# Patient Record
Sex: FEMALE | Race: WHITE | Employment: FULL TIME | URBAN - METROPOLITAN AREA
[De-identification: names, ages, dates, MRNs, and addresses within clinical notes are randomized per-mention and may not be internally consistent; named-entity substitution may affect disease eponyms.]

---

## 2017-09-19 ENCOUNTER — OFFICE VISIT (OUTPATIENT)
Dept: OBGYN | Age: 32
End: 2017-09-19
Payer: COMMERCIAL

## 2017-09-19 VITALS
BODY MASS INDEX: 25.15 KG/M2 | HEIGHT: 69 IN | DIASTOLIC BLOOD PRESSURE: 68 MMHG | SYSTOLIC BLOOD PRESSURE: 110 MMHG | WEIGHT: 169.8 LBS

## 2017-09-19 DIAGNOSIS — R10.2 PELVIC CRAMPING: Primary | ICD-10-CM

## 2017-09-19 DIAGNOSIS — R21 RASH AND NONSPECIFIC SKIN ERUPTION: ICD-10-CM

## 2017-09-19 DIAGNOSIS — N92.6 IRREGULAR PERIODS/MENSTRUAL CYCLES: ICD-10-CM

## 2017-09-19 DIAGNOSIS — F41.9 ANXIETY: ICD-10-CM

## 2017-09-19 PROCEDURE — 99213 OFFICE O/P EST LOW 20 MIN: CPT | Performed by: ADVANCED PRACTICE MIDWIFE

## 2017-09-19 PROCEDURE — 76830 TRANSVAGINAL US NON-OB: CPT | Performed by: ADVANCED PRACTICE MIDWIFE

## 2017-09-19 RX ORDER — ESCITALOPRAM OXALATE 5 MG/1
5 TABLET ORAL DAILY
Qty: 30 TABLET | Refills: 1 | Status: SHIPPED | OUTPATIENT
Start: 2017-09-19 | End: 2017-11-09 | Stop reason: SDUPTHER

## 2017-09-19 RX ORDER — BETAMETHASONE DIPROPIONATE 0.5 MG/G
OINTMENT TOPICAL
Qty: 1 TUBE | Refills: 1 | Status: SHIPPED | OUTPATIENT
Start: 2017-09-19 | End: 2017-10-19

## 2017-09-19 ASSESSMENT — PATIENT HEALTH QUESTIONNAIRE - PHQ9
SUM OF ALL RESPONSES TO PHQ QUESTIONS 1-9: 0
1. LITTLE INTEREST OR PLEASURE IN DOING THINGS: 0
2. FEELING DOWN, DEPRESSED OR HOPELESS: 0
SUM OF ALL RESPONSES TO PHQ9 QUESTIONS 1 & 2: 0

## 2017-11-08 ENCOUNTER — OFFICE VISIT (OUTPATIENT)
Dept: OBGYN | Age: 32
End: 2017-11-08
Payer: COMMERCIAL

## 2017-11-08 VITALS
WEIGHT: 172 LBS | SYSTOLIC BLOOD PRESSURE: 116 MMHG | HEIGHT: 69 IN | DIASTOLIC BLOOD PRESSURE: 76 MMHG | BODY MASS INDEX: 25.48 KG/M2

## 2017-11-08 DIAGNOSIS — R21 RASH AND NONSPECIFIC SKIN ERUPTION: Primary | ICD-10-CM

## 2017-11-08 DIAGNOSIS — F43.23 ADJUSTMENT DISORDER WITH MIXED ANXIETY AND DEPRESSED MOOD: ICD-10-CM

## 2017-11-08 PROCEDURE — 99212 OFFICE O/P EST SF 10 MIN: CPT | Performed by: ADVANCED PRACTICE MIDWIFE

## 2017-11-08 NOTE — PROGRESS NOTES
PROBLEM VISIT     Date of service: 2017    Minh Adkins  Is a 29 y.o.  female    PT's PCP is: Bess Lubin MD     : 1985                                             Subjective:       Patient's last menstrual period was 2017 (approximate). OB History    Para Term  AB Living   2 2 2     2   SAB TAB Ectopic Molar Multiple Live Births             2      # Outcome Date GA Lbr Mohan/2nd Weight Sex Delivery Anes PTL Lv   2 Term 02/24/15 40w0d   F Vag-Spont EPI  MARVEL   1 Term 12 40w0d  7 lb 5 oz (3.317 kg) F Vag-Spont EPI  MARVEL           History   Smoking Status    Never Smoker   Smokeless Tobacco    Never Used        History   Alcohol Use    Yes     Comment: social       History   Sexual Activity    Sexual activity: Yes    Partners: Male       Allergies: Review of patient's allergies indicates no known allergies. Chief Complaint   Patient presents with    Other     Medication check. Patient currently taking Lexapro 5 mg qd and feels things have improved. Last Yearly:      Last pap:     Last HPV:     PE:  Vital Signs  Blood pressure 116/76, height 5' 9\" (1.753 m), weight 172 lb (78 kg), last menstrual period 2017, not currently breastfeeding. Labs:    No results found for this visit on 17. NURSE: Gail ACEVES    HPI: here for review, had irregular cycles which have now normalized, had anxiety and depression issues from issues with , her issues have not resolved but she is \"handling better\" she had a scaly rash over knees and large joints and the steroid cream is helping      PT denies fever, chills, nausea and vomiting       Objective: No acute distress  Excellent communications  Well-nourished                           Assessment and Plan         1. Rash and nonspecific skin eruption     2.  Adjustment disorder with mixed anxiety and depressed mood               Return in about 6 months (around 2018) for med check.    FF: 15 minutes    There are no Patient Instructions on file for this visit. Over 75%of time spent on counseling and care coordination on: see assessment and plan,  She was also counseled on her preventative health maintenance recommendations and follow-up.       Kristin Barnes,11/8/2017 3:55 PM

## 2017-11-09 DIAGNOSIS — F41.9 ANXIETY: ICD-10-CM

## 2017-11-14 DIAGNOSIS — F41.9 ANXIETY: Primary | ICD-10-CM

## 2017-11-14 RX ORDER — ESCITALOPRAM OXALATE 5 MG/1
5 TABLET ORAL DAILY
Qty: 30 TABLET | Refills: 5 | Status: SHIPPED | OUTPATIENT
Start: 2017-11-14 | End: 2018-06-28

## 2017-11-14 RX ORDER — ESCITALOPRAM OXALATE 5 MG/1
5 TABLET ORAL DAILY
Qty: 30 TABLET | Refills: 1 | Status: SHIPPED | OUTPATIENT
Start: 2017-11-14 | End: 2018-06-28

## 2018-01-22 ENCOUNTER — TELEPHONE (OUTPATIENT)
Dept: OBGYN | Age: 33
End: 2018-01-22

## 2018-01-22 DIAGNOSIS — N92.6 IRREGULAR MENSES: Primary | ICD-10-CM

## 2018-01-22 RX ORDER — LEVONORGESTREL / ETHINYL ESTRADIOL AND ETHINYL ESTRADIOL 150-30(84)
1 KIT ORAL DAILY
Qty: 91 TABLET | Refills: 3 | Status: SHIPPED | OUTPATIENT
Start: 2018-01-22 | End: 2019-01-17 | Stop reason: SDUPTHER

## 2018-05-03 DIAGNOSIS — F41.9 ANXIETY: Primary | ICD-10-CM

## 2018-05-03 RX ORDER — ESCITALOPRAM OXALATE 5 MG/1
5 TABLET ORAL DAILY
Qty: 30 TABLET | Refills: 0 | Status: SHIPPED | OUTPATIENT
Start: 2018-05-03 | End: 2018-05-29 | Stop reason: SDUPTHER

## 2018-05-29 DIAGNOSIS — F41.9 ANXIETY: ICD-10-CM

## 2018-05-29 RX ORDER — ESCITALOPRAM OXALATE 5 MG/1
5 TABLET ORAL DAILY
Qty: 30 TABLET | Refills: 0 | Status: SHIPPED | OUTPATIENT
Start: 2018-05-29 | End: 2018-06-28 | Stop reason: SDUPTHER

## 2018-06-28 DIAGNOSIS — F41.9 ANXIETY: ICD-10-CM

## 2018-06-28 RX ORDER — ESCITALOPRAM OXALATE 5 MG/1
5 TABLET ORAL DAILY
Qty: 30 TABLET | Refills: 0 | Status: SHIPPED | OUTPATIENT
Start: 2018-06-28 | End: 2018-08-01 | Stop reason: SDUPTHER

## 2019-01-17 DIAGNOSIS — N92.6 IRREGULAR MENSES: ICD-10-CM

## 2019-01-17 RX ORDER — LEVONORGESTREL / ETHINYL ESTRADIOL AND ETHINYL ESTRADIOL 150-30(84)
1 KIT ORAL DAILY
Qty: 91 TABLET | Refills: 3 | Status: SHIPPED | OUTPATIENT
Start: 2019-01-17 | End: 2022-07-25

## 2019-10-03 ENCOUNTER — OFFICE VISIT (OUTPATIENT)
Dept: OBGYN | Age: 34
End: 2019-10-03
Payer: COMMERCIAL

## 2019-10-03 VITALS
SYSTOLIC BLOOD PRESSURE: 114 MMHG | WEIGHT: 170.6 LBS | HEIGHT: 69 IN | BODY MASS INDEX: 25.27 KG/M2 | DIASTOLIC BLOOD PRESSURE: 74 MMHG

## 2019-10-03 DIAGNOSIS — F43.23 ADJUSTMENT DISORDER WITH MIXED ANXIETY AND DEPRESSED MOOD: ICD-10-CM

## 2019-10-03 DIAGNOSIS — Z01.419 ENCOUNTER FOR ANNUAL ROUTINE GYNECOLOGICAL EXAMINATION: ICD-10-CM

## 2019-10-03 DIAGNOSIS — N92.6 IRREGULAR MENSES: Primary | ICD-10-CM

## 2019-10-03 PROCEDURE — 99213 OFFICE O/P EST LOW 20 MIN: CPT | Performed by: ADVANCED PRACTICE MIDWIFE

## 2019-10-03 PROCEDURE — G0444 DEPRESSION SCREEN ANNUAL: HCPCS | Performed by: ADVANCED PRACTICE MIDWIFE

## 2019-10-03 RX ORDER — ESCITALOPRAM OXALATE 5 MG/1
5 TABLET ORAL DAILY
Qty: 30 TABLET | Refills: 11 | Status: SHIPPED | OUTPATIENT
Start: 2019-10-03 | End: 2022-03-31 | Stop reason: ALTCHOICE

## 2019-10-03 RX ORDER — LEVONORGESTREL AND ETHINYL ESTRADIOL 0.15-0.03
1 KIT ORAL DAILY
Qty: 1 PACKET | Refills: 3 | Status: SHIPPED | OUTPATIENT
Start: 2019-10-03 | End: 2020-10-26

## 2019-10-03 ASSESSMENT — PATIENT HEALTH QUESTIONNAIRE - PHQ9
2. FEELING DOWN, DEPRESSED OR HOPELESS: 2
SUM OF ALL RESPONSES TO PHQ QUESTIONS 1-9: 17
9. THOUGHTS THAT YOU WOULD BE BETTER OFF DEAD, OR OF HURTING YOURSELF: 0
1. LITTLE INTEREST OR PLEASURE IN DOING THINGS: 3
5. POOR APPETITE OR OVEREATING: 3
7. TROUBLE CONCENTRATING ON THINGS, SUCH AS READING THE NEWSPAPER OR WATCHING TELEVISION: 1
8. MOVING OR SPEAKING SO SLOWLY THAT OTHER PEOPLE COULD HAVE NOTICED. OR THE OPPOSITE, BEING SO FIGETY OR RESTLESS THAT YOU HAVE BEEN MOVING AROUND A LOT MORE THAN USUAL: 1
SUM OF ALL RESPONSES TO PHQ9 QUESTIONS 1 & 2: 5
4. FEELING TIRED OR HAVING LITTLE ENERGY: 3
10. IF YOU CHECKED OFF ANY PROBLEMS, HOW DIFFICULT HAVE THESE PROBLEMS MADE IT FOR YOU TO DO YOUR WORK, TAKE CARE OF THINGS AT HOME, OR GET ALONG WITH OTHER PEOPLE: 0
6. FEELING BAD ABOUT YOURSELF - OR THAT YOU ARE A FAILURE OR HAVE LET YOURSELF OR YOUR FAMILY DOWN: 1
3. TROUBLE FALLING OR STAYING ASLEEP: 3
SUM OF ALL RESPONSES TO PHQ QUESTIONS 1-9: 17

## 2021-02-04 DIAGNOSIS — N92.6 IRREGULAR MENSES: ICD-10-CM

## 2021-02-04 RX ORDER — LEVONORGESTREL AND ETHINYL ESTRADIOL 0.15-0.03
KIT ORAL
Qty: 91 TABLET | Refills: 0 | Status: SHIPPED | OUTPATIENT
Start: 2021-02-04 | End: 2022-09-13 | Stop reason: ALTCHOICE

## 2021-03-24 ENCOUNTER — HOSPITAL ENCOUNTER (OUTPATIENT)
Age: 36
Setting detail: SPECIMEN
Discharge: HOME | End: 2021-03-24
Payer: COMMERCIAL

## 2021-03-24 ENCOUNTER — OFFICE VISIT (OUTPATIENT)
Dept: OBGYN | Age: 36
End: 2021-03-24
Payer: COMMERCIAL

## 2021-03-24 VITALS
HEIGHT: 69 IN | WEIGHT: 170 LBS | SYSTOLIC BLOOD PRESSURE: 116 MMHG | BODY MASS INDEX: 25.18 KG/M2 | DIASTOLIC BLOOD PRESSURE: 70 MMHG

## 2021-03-24 DIAGNOSIS — Z01.419 ENCOUNTER FOR ANNUAL ROUTINE GYNECOLOGICAL EXAMINATION: Primary | ICD-10-CM

## 2021-03-24 DIAGNOSIS — N92.6 IRREGULAR MENSES: ICD-10-CM

## 2021-03-24 DIAGNOSIS — Z12.4 SCREENING FOR CERVICAL CANCER: ICD-10-CM

## 2021-03-24 PROCEDURE — 99395 PREV VISIT EST AGE 18-39: CPT | Performed by: ADVANCED PRACTICE MIDWIFE

## 2021-03-24 PROCEDURE — G0145 SCR C/V CYTO,THINLAYER,RESCR: HCPCS

## 2021-03-24 PROCEDURE — 87624 HPV HI-RISK TYP POOLED RSLT: CPT

## 2021-03-24 RX ORDER — SPIRONOLACTONE 50 MG/1
TABLET, FILM COATED ORAL
COMMUNITY
Start: 2021-03-03 | End: 2022-09-13

## 2021-03-24 RX ORDER — LEVONORGESTREL / ETHINYL ESTRADIOL AND ETHINYL ESTRADIOL 150-30(84)
1 KIT ORAL DAILY
Qty: 91 TABLET | Refills: 3 | Status: SHIPPED | OUTPATIENT
Start: 2021-03-24 | End: 2022-07-25

## 2021-03-24 ASSESSMENT — ENCOUNTER SYMPTOMS
BACK PAIN: 0
ABDOMINAL PAIN: 0
SHORTNESS OF BREATH: 0

## 2021-03-24 ASSESSMENT — PATIENT HEALTH QUESTIONNAIRE - PHQ9
SUM OF ALL RESPONSES TO PHQ QUESTIONS 1-9: 0
1. LITTLE INTEREST OR PLEASURE IN DOING THINGS: 0

## 2021-03-24 NOTE — PROGRESS NOTES
YEARLY PHYSICAL    Date of service: 3/24/2021    Quincy Bailey  Is a 37 y.o.   female    PT's PCP is: Artem Shore MD     : 1985                                             Subjective:       Patient's last menstrual period was 2021 (approximate). Are your menses regular: yes    OB History    Para Term  AB Living   2 2 2     2   SAB TAB Ectopic Molar Multiple Live Births             2      # Outcome Date GA Lbr Mohan/2nd Weight Sex Delivery Anes PTL Lv   2 Term 02/24/15 40w0d   F Vag-Spont EPI  MARVLE   1 Term 12 40w0d  7 lb 5 oz (3.317 kg) F Vag-Spont EPI  MARVEL        Social History     Tobacco Use   Smoking Status Never Smoker   Smokeless Tobacco Never Used        Social History     Substance and Sexual Activity   Alcohol Use Yes    Comment: social       Family History   Problem Relation Age of Onset    Cancer Maternal Grandfather         Colon    Other Other         No family HX DVT, Breast or Ovarian Cancer       Any family history of breast or ovarian cancer: No    Any family history of blood clots: No      Allergies: Patient has no known allergies.       Current Outpatient Medications:     spironolactone (ALDACTONE) 50 MG tablet, TAKE 1 TABLET BY MOUTH EVERY DAY, Disp: , Rfl:     levonorgestrel-ethinyl estradiol (SEASONALE) 0.15-0.03 MG per tablet, TAKE 1 TABLET BY MOUTH EVERY DAY, Disp: 91 tablet, Rfl: 0    escitalopram (LEXAPRO) 5 MG tablet, Take 1 tablet by mouth daily (Patient not taking: Reported on 3/24/2021), Disp: 30 tablet, Rfl: 11    Levonorgest-Eth Estrad -Day 0.15-0.03 &0.01 MG TABS, TAKE 1 TABLET BY MOUTH DAILY (Patient not taking: Reported on 3/24/2021), Disp: 91 tablet, Rfl: 3    escitalopram (LEXAPRO) 5 MG tablet, TAKE 1 TABLET BY MOUTH EVERY DAY (Patient not taking: Reported on 10/3/2019), Disp: 30 tablet, Rfl: 12    Social History     Substance and Sexual Activity   Sexual Activity Yes    Partners: Male    Birth control/protection: Pill       Any bleeding or pain with intercourse: No    Last Yearly:  2019    Last pap: 2017    Last HPV: Never    Last Mammogram: Never    Last Dexascan Never    Last colorectal screen- type:Never*  date  Never    Do you do self breast exams: No    Past Medical History:   Diagnosis Date    Anxiety     Irregular menses        Past Surgical History:   Procedure Laterality Date    HIP SURGERY  1990       Family History   Problem Relation Age of Onset    Cancer Maternal Grandfather         Colon    Other Other         No family HX DVT, Breast or Ovarian Cancer       Chief Complaint   Patient presents with    Gynecologic Exam     Yearly exam. Last pap 07/31/2017 negative. PE:  Vital Signs  Blood pressure 116/70, height 5' 9\" (1.753 m), weight 170 lb (77.1 kg), last menstrual period 01/13/2021, not currently breastfeeding. Estimated body mass index is 25.1 kg/m² as calculated from the following:    Height as of this encounter: 5' 9\" (1.753 m). Weight as of this encounter: 170 lb (77.1 kg). Labs:    No results found for this visit on 03/24/21. PHQ-9 Total Score: 0 (3/24/2021  4:03 PM)      NURSE: dmitry ACEVES    HPI: here for annual gyn exam; is on aldactone for skin sees dermatologist    Review of Systems   Constitutional: Negative. HENT: Negative for congestion. Respiratory: Negative for shortness of breath. Cardiovascular: Negative for chest pain. Gastrointestinal: Negative for abdominal pain. Genitourinary: Negative for dysuria. Musculoskeletal: Negative for back pain. Skin: Negative for rash. Neurological: Negative for headaches. Psychiatric/Behavioral: The patient is not nervous/anxious.           Objective  Lymphatic:   no lymphadenopathy  Heent:   negative   Cor: regular rate and rhythm, no murmurs              Pul:clear to auscultation bilaterally- no wheezes, rales or rhonchi, normal air movement, no respiratory distress      GI: Abdomen soft, non-tender. BS normal. No masses,  No organomegaly           Extremities: normal strength, tone, and muscle mass   Breasts: Breast:normal appearance, no masses or tenderness   Pelvic Exam: GENITAL/URINARY:  External Genitalia:  General appearance; normal, Hair distribution; normal, Lesions absent  Urethral Meatus:  Size normal, Location normal, Lesions absent, Prolapse absent  Urethra: Fullness absent, Masses absent  Bladder:  Fullness absent, Masses absent, Tenderness absent, Cystocele absent  Vagina:  General appearance normal, Estrogen effect normal, Discharge absent, Lesions absent, Pelvic support normal  Cervix:  General appearance normal, Lesions absent, Discharge absent, Tenderness absent, Enlargement absent, Nodularity absent  Uterus:  Size normal, Tenderness absent  Adenexa: Masses absent, Tenderness absent  Anus/Perineum:  Lesions absent and Masses absent                                                              Assessment and Plan          Diagnosis Orders   1. Encounter for annual routine gynecological examination     2. Screening for cervical cancer  PAP SMEAR             I am having Samanta Irby maintain her escitalopram, Levonorgest-Eth Estrad 91-Day, escitalopram, levonorgestrel-ethinyl estradiol, and spironolactone. Return in about 1 year (around 3/24/2022) for yearly. She was also counseled on her preventative health maintenance recommendations and follow-up. There are no Patient Instructions on file for this visit.     Iftikhar Barnes,3/24/2021 4:31 PM

## 2021-03-26 LAB
HPV SAMPLE: NORMAL
HPV, GENOTYPE 16: NOT DETECTED
HPV, GENOTYPE 18: NOT DETECTED
HPV, HIGH RISK OTHER: NOT DETECTED
HPV, INTERPRETATION: NORMAL
SPECIMEN DESCRIPTION: NORMAL

## 2021-03-30 LAB — CYTOLOGY REPORT: NORMAL

## 2022-03-30 ENCOUNTER — OFFICE VISIT (OUTPATIENT)
Dept: OBGYN | Age: 37
End: 2022-03-30
Payer: COMMERCIAL

## 2022-03-30 VITALS
HEIGHT: 69 IN | DIASTOLIC BLOOD PRESSURE: 74 MMHG | SYSTOLIC BLOOD PRESSURE: 122 MMHG | BODY MASS INDEX: 25.56 KG/M2 | WEIGHT: 172.6 LBS

## 2022-03-30 DIAGNOSIS — N92.6 IRREGULAR MENSES: ICD-10-CM

## 2022-03-30 DIAGNOSIS — Z01.419 ENCOUNTER FOR ANNUAL ROUTINE GYNECOLOGICAL EXAMINATION: Primary | ICD-10-CM

## 2022-03-30 PROCEDURE — 99395 PREV VISIT EST AGE 18-39: CPT | Performed by: ADVANCED PRACTICE MIDWIFE

## 2022-03-30 RX ORDER — LEVONORGESTREL / ETHINYL ESTRADIOL AND ETHINYL ESTRADIOL 150-30(84)
1 KIT ORAL DAILY
Qty: 91 TABLET | Refills: 3 | Status: SHIPPED | OUTPATIENT
Start: 2022-03-30 | End: 2022-07-25

## 2022-03-30 NOTE — PROGRESS NOTES
YEARLY PHYSICAL    Date of service: 3/30/2022    Debra Saxena  Is a 31 y.o.   female    PT's PCP is: Jae Jacobsen MD     : 1985                                             Subjective:       Patient's last menstrual period was 2022 (approximate). Are your menses regular: yes    OB History    Para Term  AB Living   2 2 2     2   SAB IAB Ectopic Molar Multiple Live Births             2      # Outcome Date GA Lbr Mohan/2nd Weight Sex Delivery Anes PTL Lv   2 Term 02/24/15 40w0d   F Vag-Spont EPI  MARVEL   1 Term 12 40w0d  7 lb 5 oz (3.317 kg) F Vag-Spont EPI  MARVEL        Social History     Tobacco Use   Smoking Status Never Smoker   Smokeless Tobacco Never Used        Social History     Substance and Sexual Activity   Alcohol Use Yes    Comment: social       Family History   Problem Relation Age of Onset    Cancer Maternal Grandfather         Colon    Other Other         No family HX DVT, Breast or Ovarian Cancer       Any family history of breast or ovarian cancer: No    Any family history of blood clots: No    Have you had a positive covid test: No    Have you had the covid immunization: Yes      Allergies: Patient has no known allergies.       Current Outpatient Medications:     spironolactone (ALDACTONE) 50 MG tablet, TAKE 1 TABLET BY MOUTH EVERY DAY, Disp: , Rfl:     levonorgestrel-ethinyl estradiol (SEASONALE) 0.15-0.03 MG per tablet, TAKE 1 TABLET BY MOUTH EVERY DAY, Disp: 91 tablet, Rfl: 0    Levonorgest-Eth Estrad 91-Day 0.15-0.03 &0.01 MG TABS, Take 1 tablet by mouth daily, Disp: 91 tablet, Rfl: 3    escitalopram (LEXAPRO) 5 MG tablet, Take 1 tablet by mouth daily (Patient not taking: Reported on 3/24/2021), Disp: 30 tablet, Rfl: 11    Levonorgest-Eth Estrad 91-Day 0.15-0.03 &0.01 MG TABS, TAKE 1 TABLET BY MOUTH DAILY (Patient not taking: Reported on 3/24/2021), Disp: 91 tablet, Rfl: 3   escitalopram (LEXAPRO) 5 MG tablet, TAKE 1 TABLET BY MOUTH EVERY DAY (Patient not taking: Reported on 10/3/2019), Disp: 30 tablet, Rfl: 12    Social History     Substance and Sexual Activity   Sexual Activity Yes    Partners: Male    Birth control/protection: Pill       Any bleeding or pain with intercourse: No    Last Yearly:  2021    Last pap: 2021    Last HPV: 2021    Last Mammogram: never    Last Cristal Desir never    Last colorectal screen- type:never*  date  never    Do you do self breast exams: No    Past Medical History:   Diagnosis Date    Anxiety     Irregular menses        Past Surgical History:   Procedure Laterality Date    HIP SURGERY         Family History   Problem Relation Age of Onset    Cancer Maternal Grandfather         Colon    Other Other         No family HX DVT, Breast or Ovarian Cancer       Chief Complaint   Patient presents with    Gynecologic Exam     Yearly exam. Last pap 2021 negative, HPV not detected. PE:  Vital Signs  Blood pressure 122/74, height 5' 9\" (1.753 m), weight 172 lb 9.6 oz (78.3 kg), last menstrual period 2022, not currently breastfeeding. Estimated body mass index is 25.49 kg/m² as calculated from the following:    Height as of this encounter: 5' 9\" (1.753 m). Weight as of this encounter: 172 lb 9.6 oz (78.3 kg). Labs:    No results found for this visit on 22. No data recorded    NURSE: Jennifer ACEVES    HPI: here for annual gyn exam, is having a hard time because grandma  this week    Review of Systems   Constitutional: Negative. HENT: Negative for congestion. Respiratory: Negative for shortness of breath. Cardiovascular: Negative for chest pain. Gastrointestinal: Negative for abdominal pain. Genitourinary: Negative for dysuria. Musculoskeletal: Negative for back pain. Skin: Negative for rash. Neurological: Negative for headaches. Psychiatric/Behavioral: The patient is not nervous/anxious. Objective  Lymphatic:   no lymphadenopathy  Heent:   negative   Cor: regular rate and rhythm, no murmurs              Pul:clear to auscultation bilaterally- no wheezes, rales or rhonchi, normal air movement, no respiratory distress      GI: Abdomen soft, non-tender. BS normal. No masses,  No organomegaly           Extremities: normal strength, tone, and muscle mass   Breasts: Breast:normal appearance, no masses or tenderness   Pelvic Exam: GENITAL/URINARY:  External Genitalia:  General appearance; normal, Hair distribution; normal, Lesions absent  Urethral Meatus:  Size normal, Location normal, Lesions absent, Prolapse absent  Urethra: Fullness absent, Masses absent  Bladder:  Fullness absent, Masses absent, Tenderness absent, Cystocele absent  Vagina:  General appearance normal, Estrogen effect normal, Discharge absent, Lesions absent, Pelvic support normal  Cervix:  General appearance normal, Lesions absent, Discharge absent, Tenderness absent, Enlargement absent, Nodularity absent  Uterus:  Size normal, Tenderness absent  Adenexa: Masses absent, Tenderness absent  Anus/Perineum:  Lesions absent and Masses absent                              Assessment and Plan          Diagnosis Orders   1. Encounter for annual routine gynecological examination     2. Irregular menses  Levonorgest-Eth Estrad 91-Day 0.15-0.03 &0.01 MG TABS             I am having Samanta Irby start on Levonorgest-Eth Estrad 91-Day. I am also having her maintain her escitalopram, Levonorgest-Eth Estrad 91-Day, escitalopram, levonorgestrel-ethinyl estradiol, spironolactone, and Levonorgest-Eth Estrad 91-Day. Return in about 1 year (around 3/30/2023) for yearly. She was also counseled on her preventative health maintenance recommendations and follow-up. There are no Patient Instructions on file for this visit.     MIGUELINA Zamarripa CNM,3/30/2022 5:10 PM

## 2022-03-31 ASSESSMENT — ENCOUNTER SYMPTOMS
ABDOMINAL PAIN: 0
SHORTNESS OF BREATH: 0
BACK PAIN: 0

## 2022-09-13 ENCOUNTER — OFFICE VISIT (OUTPATIENT)
Dept: OBGYN | Age: 37
End: 2022-09-13
Payer: COMMERCIAL

## 2022-09-13 VITALS
BODY MASS INDEX: 25.3 KG/M2 | WEIGHT: 170.8 LBS | HEIGHT: 69 IN | SYSTOLIC BLOOD PRESSURE: 120 MMHG | DIASTOLIC BLOOD PRESSURE: 76 MMHG

## 2022-09-13 DIAGNOSIS — Z97.5 PRESENCE OF IUD: Primary | ICD-10-CM

## 2022-09-13 PROCEDURE — 99212 OFFICE O/P EST SF 10 MIN: CPT | Performed by: ADVANCED PRACTICE MIDWIFE

## 2022-09-13 NOTE — PROGRESS NOTES
PROBLEM VISIT     Date of service: 2022    Frank Coto  Is a 31 y.o.  female    PT's PCP is: Stewart Armenta MD     : 1985                                             Subjective:       Patient's last menstrual period was 2022 (approximate). OB History    Para Term  AB Living   2 2 2     2   SAB IAB Ectopic Molar Multiple Live Births             2      # Outcome Date GA Lbr Mohan/2nd Weight Sex Delivery Anes PTL Lv   2 Term 02/24/15 40w0d   F Vag-Spont EPI  MARVEL   1 Term 12 40w0d  7 lb 5 oz (3.317 kg) F Vag-Spont EPI  MARVEL        Social History     Tobacco Use   Smoking Status Never   Smokeless Tobacco Never        Social History     Substance and Sexual Activity   Alcohol Use Yes    Comment: social       Allergies: Patient has no known allergies. Current Outpatient Medications:     PARAGARD INTRAUTERINE COPPER IU, by IntraUTERine route, Disp: , Rfl:     spironolactone (ALDACTONE) 50 MG tablet, TAKE 1 TABLET BY MOUTH EVERY DAY (Patient not taking: Reported on 2022), Disp: , Rfl:     levonorgestrel-ethinyl estradiol (Derinda Bays) 0.15-0.03 MG per tablet, TAKE 1 TABLET BY MOUTH EVERY DAY (Patient not taking: Reported on 2022), Disp: 91 tablet, Rfl: 0    Social History     Substance and Sexual Activity   Sexual Activity Yes    Partners: Male    Birth control/protection: I.U.D. Comment: Paragard 2022       Last Yearly:  2021    Last pap: 2021    Last HPV: 2021    Have you had a positive covid test: No    Have you had the covid immunization: Yes    Chief Complaint   Patient presents with    Menstrual Problem     String check, patient had Paragard  IUD placed 2022. PE:  Vital Signs  Blood pressure 120/76, height 5' 9\" (1.753 m), weight 170 lb 12.8 oz (77.5 kg), last menstrual period 2022, not currently breastfeeding.   Estimated body mass index is 25.22 kg/m² as calculated from the following:    Height as of this encounter: 5' 9\" (1.753 m). Weight as of this encounter: 170 lb 12.8 oz (77.5 kg). No data recorded    NURSE: Jennifer ACEVES    HPI: here for iud string check, denies c/o     PT denies fever, chills, nausea and vomiting       Objective   No acute distress  Excellent communications  Well-nourished    Pelvic Exam: GENITAL/URINARY:  External Genitalia:  General appearance; normal, Hair distribution; normal, Lesions absent  Urethral Meatus:  Size normal, Location normal, Lesions absent, Prolapse absent  Urethra: Fullness absent, Masses absent  Bladder:  Fullness absent, Masses absent, Tenderness absent, Cystocele absent  Vagina:  General appearance normal, Estrogen effect normal, Discharge absent, Lesions absent, Pelvic support normal  Cervix:  General appearance normal, Lesions absent, Discharge absent, Tenderness absent, Enlargement absent, Nodularity absent, strings present from os                                                      Results reviewed today:    No results found for this visit on 09/13/22. Assessment and Plan          Diagnosis Orders   1. Presence of IUD                  I have discontinued Samanta Irby's levonorgestrel-ethinyl estradiol and spironolactone. I am also having her maintain her PARAGARD INTRAUTERINE COPPER IU. We will continue to administer Paragard Intrauterine Copper. Return return to normal yearly appointments. There are no Patient Instructions on file for this visit.     Time spent 15 minutes      MIGUELINA Whyte CNM,9/13/2022 4:13 PM

## 2023-04-05 ENCOUNTER — OFFICE VISIT (OUTPATIENT)
Dept: OBGYN | Age: 38
End: 2023-04-05

## 2023-04-05 VITALS
HEIGHT: 69 IN | WEIGHT: 174 LBS | SYSTOLIC BLOOD PRESSURE: 112 MMHG | DIASTOLIC BLOOD PRESSURE: 70 MMHG | BODY MASS INDEX: 25.77 KG/M2

## 2023-04-05 DIAGNOSIS — Z01.419 ENCOUNTER FOR ANNUAL ROUTINE GYNECOLOGICAL EXAMINATION: Primary | ICD-10-CM

## 2023-04-05 NOTE — PROGRESS NOTES
YEARLY PHYSICAL    Date of service: 2023    Delana Landau  Is a 30 y.o.  , female    PT's PCP is: Angie Pat MD     : 1985                                             Subjective:       Patient's last menstrual period was 2023 (approximate). Are your menses regular: yes    OB History    Para Term  AB Living   2 2 2     2   SAB IAB Ectopic Molar Multiple Live Births             2      # Outcome Date GA Lbr Mohan/2nd Weight Sex Delivery Anes PTL Lv   2 Term 02/24/15 40w0d   F Vag-Spont EPI  MARVEL   1 Term 12 40w0d  7 lb 5 oz (3.317 kg) F Vag-Spont EPI  MARVEL        Social History     Tobacco Use   Smoking Status Never   Smokeless Tobacco Never        Social History     Substance and Sexual Activity   Alcohol Use Yes    Comment: social       Family History   Problem Relation Age of Onset    Diabetes Father     Cancer Maternal Grandfather         Colon    Other Other         No family HX DVT, Breast or Ovarian Cancer       Any family history of breast or ovarian cancer: No    Any family history of blood clots: No    Have you had a positive covid test: No    Have you had the covid immunization: Yes      Allergies: Patient has no known allergies. Current Outpatient Medications:     PARAGARD INTRAUTERINE COPPER IU, by IntraUTERine route, Disp: , Rfl:     Social History     Substance and Sexual Activity   Sexual Activity Yes    Partners: Male    Birth control/protection: I.U.D.     Comment: Paragard 2022       Any bleeding or pain with intercourse: No    Last Yearly date:  2022    Last pap date and results: 2021 negative    Last HPV date and results: 2021 negative    Last Mammogram date and results: never    Last Dexa scan date and results: never    Last colorectal screen- type:never*  date  never results never    Do you do self breast exams: No    Past Medical History:   Diagnosis Date

## 2023-04-07 ASSESSMENT — ENCOUNTER SYMPTOMS
SHORTNESS OF BREATH: 0
BACK PAIN: 0
ABDOMINAL PAIN: 0

## 2024-06-11 ENCOUNTER — OFFICE VISIT (OUTPATIENT)
Dept: OBGYN | Age: 39
End: 2024-06-11
Payer: COMMERCIAL

## 2024-06-11 VITALS
SYSTOLIC BLOOD PRESSURE: 122 MMHG | WEIGHT: 172.8 LBS | BODY MASS INDEX: 25.59 KG/M2 | HEIGHT: 69 IN | DIASTOLIC BLOOD PRESSURE: 74 MMHG

## 2024-06-11 DIAGNOSIS — N92.6 IRREGULAR MENSES: ICD-10-CM

## 2024-06-11 DIAGNOSIS — Z01.419 ENCOUNTER FOR ANNUAL ROUTINE GYNECOLOGICAL EXAMINATION: Primary | ICD-10-CM

## 2024-06-11 PROCEDURE — 99395 PREV VISIT EST AGE 18-39: CPT | Performed by: ADVANCED PRACTICE MIDWIFE

## 2024-06-11 SDOH — ECONOMIC STABILITY: FOOD INSECURITY: WITHIN THE PAST 12 MONTHS, THE FOOD YOU BOUGHT JUST DIDN'T LAST AND YOU DIDN'T HAVE MONEY TO GET MORE.: NEVER TRUE

## 2024-06-11 SDOH — ECONOMIC STABILITY: INCOME INSECURITY: HOW HARD IS IT FOR YOU TO PAY FOR THE VERY BASICS LIKE FOOD, HOUSING, MEDICAL CARE, AND HEATING?: NOT HARD AT ALL

## 2024-06-11 SDOH — ECONOMIC STABILITY: HOUSING INSECURITY
IN THE LAST 12 MONTHS, WAS THERE A TIME WHEN YOU DID NOT HAVE A STEADY PLACE TO SLEEP OR SLEPT IN A SHELTER (INCLUDING NOW)?: NO

## 2024-06-11 SDOH — ECONOMIC STABILITY: FOOD INSECURITY: WITHIN THE PAST 12 MONTHS, YOU WORRIED THAT YOUR FOOD WOULD RUN OUT BEFORE YOU GOT MONEY TO BUY MORE.: NEVER TRUE

## 2024-06-11 ASSESSMENT — ENCOUNTER SYMPTOMS
SINUS PAIN: 0
CHEST TIGHTNESS: 0
SHORTNESS OF BREATH: 0
ABDOMINAL PAIN: 0

## 2024-06-11 ASSESSMENT — PATIENT HEALTH QUESTIONNAIRE - PHQ9
SUM OF ALL RESPONSES TO PHQ QUESTIONS 1-9: 0
2. FEELING DOWN, DEPRESSED OR HOPELESS: NOT AT ALL
SUM OF ALL RESPONSES TO PHQ QUESTIONS 1-9: 0
1. LITTLE INTEREST OR PLEASURE IN DOING THINGS: NOT AT ALL
SUM OF ALL RESPONSES TO PHQ9 QUESTIONS 1 & 2: 0

## 2024-06-11 NOTE — PROGRESS NOTES
YEARLY PHYSICAL    Date of service: 2024    Samanta Irby  Is a 38 y.o.  , female    PT's PCP is: Brijesh Zambrano MD     : 1985                                             Subjective:       Patient's last menstrual period was 2024 (approximate).     Are your menses regular: yes    OB History    Para Term  AB Living   2 2 2     2   SAB IAB Ectopic Molar Multiple Live Births             2      # Outcome Date GA Lbr Mohan/2nd Weight Sex Delivery Anes PTL Lv   2 Term 02/24/15 40w0d   F Vag-Spont EPI  MARVEL   1 Term 12 40w0d  3.317 kg (7 lb 5 oz) F Vag-Spont EPI  MARVEL        Social History     Tobacco Use   Smoking Status Never   Smokeless Tobacco Never        Social History     Substance and Sexual Activity   Alcohol Use Yes    Comment: social       Family History   Problem Relation Age of Onset    Diabetes Father     Cancer Maternal Grandfather         Colon    Other Other         No family HX DVT, Breast or Ovarian Cancer       Any family history of breast or ovarian cancer: No    Any family history of blood clots: No    Allergies: Patient has no known allergies.      Current Outpatient Medications:     PARAGARD INTRAUTERINE COPPER IU, by IntraUTERine route, Disp: , Rfl:     Social History     Substance and Sexual Activity   Sexual Activity Yes    Partners: Male    Birth control/protection: I.U.D.    Comment: Paragard 2022       Any bleeding or pain with intercourse: No    Last Yearly date:  2023    Last pap date : Date of last Cervical Cancer screen (HPV or PAP): 3/24/2021     results:negative    Last HPV date and results: 2021 negative    Has pt ever had an abnormal:No    IF yes result and date: never     Mammogram Result (most recent):  No results found for this or any previous visit from the past 3650 days.        DEXA Result (most recent):  No results found for this or any previous visit

## 2025-03-27 ENCOUNTER — TELEPHONE (OUTPATIENT)
Dept: UROLOGY | Age: 40
End: 2025-03-27

## 2025-03-27 DIAGNOSIS — L65.9 HAIR LOSS: ICD-10-CM

## 2025-03-27 DIAGNOSIS — R63.5 WEIGHT GAIN: ICD-10-CM

## 2025-03-27 DIAGNOSIS — N92.0 MENORRHAGIA WITH REGULAR CYCLE: ICD-10-CM

## 2025-03-27 DIAGNOSIS — R68.82 DECREASED LIBIDO: ICD-10-CM

## 2025-03-27 DIAGNOSIS — R61 NIGHT SWEATS: Primary | ICD-10-CM

## 2025-04-06 LAB
ALBUMIN: 4.2 G/DL (ref 3.5–5.2)
ALK PHOSPHATASE: 71 U/L (ref 30–101)
ALT SERPL-CCNC: 12 U/L (ref 5–33)
ANION GAP SERPL CALCULATED.3IONS-SCNC: 10 MMOL/L (ref 7–16)
AST SERPL-CCNC: 17 U/L (ref 9–40)
BASOPHILS ABSOLUTE: 0.02 K/UL (ref 0–0.2)
BASOPHILS RELATIVE PERCENT: 0.4 % (ref 0–2)
BILIRUB SERPL-MCNC: 0.2 MG/DL
BUN BLDV-MCNC: 14 MG/DL (ref 6–20)
CALCIUM SERPL-MCNC: 8.8 MG/DL (ref 8.6–10.5)
CHLORIDE BLD-SCNC: 109 MMOL/L (ref 96–107)
CO2: 23 MMOL/L (ref 18–32)
CREAT SERPL-MCNC: 0.72 MG/DL (ref 0.51–1.15)
EGFR IF NONAFRICAN AMERICAN: 110 ML/MIN/1.73M2
EOSINOPHILS ABSOLUTE: 0 K/UL (ref 0–0.8)
EOSINOPHILS RELATIVE PERCENT: 0 % (ref 0–5)
ESTRADIOL LEVEL: 100 PG/ML
FOLLICLE STIMULATING HORMONE: 6.1 MIU/ML
GLUCOSE: 95 MG/DL (ref 70–100)
HCT VFR BLD CALC: 37.2 % (ref 35–47)
HEMOGLOBIN: 12 G/DL (ref 11.9–16)
IMMATURE GRANS (ABS): 0.01 K/UL (ref 0–0.06)
IMMATURE GRANULOCYTES %: 0.2 % (ref 0–2)
IRON % SATURATION: 9 % (ref 13–45)
IRON: 37 UG/DL (ref 37–145)
LYMPHOCYTES ABSOLUTE: 1.49 K/UL (ref 0.9–5.2)
LYMPHOCYTES RELATIVE PERCENT: 31.9 % (ref 20–45)
MCH RBC QN AUTO: 26 PG (ref 26–33)
MCHC RBC AUTO-ENTMCNC: 32.3 G/DL (ref 32–35)
MCV RBC AUTO: 81 FL (ref 75–100)
MONOCYTES ABSOLUTE: 0.35 K/UL (ref 0.1–1)
MONOCYTES RELATIVE PERCENT: 7.5 % (ref 0–13)
NEUTROPHILS ABSOLUTE: 2.8 K/UL (ref 1.9–8)
NEUTROPHILS RELATIVE PERCENT: 60 % (ref 45–75)
PDW BLD-RTO: 14.1 % (ref 11.2–14.8)
PLATELET # BLD: 191 THOUS/CMM (ref 140–440)
POTASSIUM SERPL-SCNC: 4.7 MMOL/L (ref 3.5–5.4)
RBC # BLD: 4.62 MILL/CMM (ref 3.8–5.2)
SODIUM BLD-SCNC: 142 MMOL/L (ref 135–148)
T3 TOTAL: 92 NG/DL (ref 80–200)
THYROXINE (T4): 6.6 UG/DL (ref 4.5–12)
TOTAL IRON BINDING CAPACITY: 405 UG/DL (ref 250–450)
TOTAL PROTEIN: 7.1 G/DL (ref 6–8.3)
TSH SERPL DL<=0.05 MIU/L-ACNC: 1.52 UIU/ML (ref 0.27–4.2)
UNSATURATED IRON BINDING CAPACITY: 368 UG/DL (ref 112–347)
VITAMIN D 25-HYDROXY: 25.1 NG/ML (ref 30–100)
WBC # BLD: 4.7 THDS/CMM (ref 3.6–11)

## 2025-04-09 LAB
ALBUMIN: 4.5 G/DL (ref 3.6–5.1)
SEX HORMONE BINDING GLOBULIN: 70.9 NMOL/L (ref 24.6–122)
TESTOSTERONE BIOAVAILABLE, NON MALE: 5.4 NG/DL (ref 4.1–25.5)
TESTOSTERONE FREE: 2.2 PG/ML (ref 1.3–9.2)
TESTOSTERONE, LCMS: 21 NG/DL (ref 9–55)

## 2025-04-10 ENCOUNTER — RESULTS FOLLOW-UP (OUTPATIENT)
Dept: UROLOGY | Age: 40
End: 2025-04-10

## 2025-06-12 ENCOUNTER — HOSPITAL ENCOUNTER (OUTPATIENT)
Age: 40
Setting detail: SPECIMEN
Discharge: HOME | End: 2025-06-12
Payer: COMMERCIAL

## 2025-06-12 ENCOUNTER — OFFICE VISIT (OUTPATIENT)
Dept: OBGYN | Age: 40
End: 2025-06-12
Payer: COMMERCIAL

## 2025-06-12 VITALS
DIASTOLIC BLOOD PRESSURE: 84 MMHG | BODY MASS INDEX: 27.11 KG/M2 | SYSTOLIC BLOOD PRESSURE: 122 MMHG | HEIGHT: 69 IN | WEIGHT: 183 LBS

## 2025-06-12 DIAGNOSIS — Z01.419 ENCOUNTER FOR ANNUAL ROUTINE GYNECOLOGICAL EXAMINATION: Primary | ICD-10-CM

## 2025-06-12 DIAGNOSIS — Z12.4 SCREENING FOR MALIGNANT NEOPLASM OF CERVIX: ICD-10-CM

## 2025-06-12 PROCEDURE — G0145 SCR C/V CYTO,THINLAYER,RESCR: HCPCS

## 2025-06-12 PROCEDURE — 99395 PREV VISIT EST AGE 18-39: CPT | Performed by: ADVANCED PRACTICE MIDWIFE

## 2025-06-12 RX ORDER — PROGESTERONE 200 MG/1
200 CAPSULE ORAL
COMMUNITY
Start: 2025-05-26

## 2025-06-12 SDOH — ECONOMIC STABILITY: FOOD INSECURITY: WITHIN THE PAST 12 MONTHS, THE FOOD YOU BOUGHT JUST DIDN'T LAST AND YOU DIDN'T HAVE MONEY TO GET MORE.: NEVER TRUE

## 2025-06-12 SDOH — ECONOMIC STABILITY: FOOD INSECURITY: WITHIN THE PAST 12 MONTHS, YOU WORRIED THAT YOUR FOOD WOULD RUN OUT BEFORE YOU GOT MONEY TO BUY MORE.: NEVER TRUE

## 2025-06-12 ASSESSMENT — PATIENT HEALTH QUESTIONNAIRE - PHQ9
2. FEELING DOWN, DEPRESSED OR HOPELESS: NOT AT ALL
SUM OF ALL RESPONSES TO PHQ QUESTIONS 1-9: 0
1. LITTLE INTEREST OR PLEASURE IN DOING THINGS: NOT AT ALL
SUM OF ALL RESPONSES TO PHQ QUESTIONS 1-9: 0

## 2025-06-12 ASSESSMENT — ENCOUNTER SYMPTOMS
SHORTNESS OF BREATH: 0
BACK PAIN: 0
ABDOMINAL PAIN: 0

## 2025-06-12 NOTE — PROGRESS NOTES
recommendations and follow-up.     There are no Patient Instructions on file for this visit.    Sandi Barnes, MIGUELINA - DILLON,6/13/2025 3:00 PM

## 2025-06-23 LAB — CYTOLOGY REPORT: NORMAL

## 2025-06-24 ENCOUNTER — RESULTS FOLLOW-UP (OUTPATIENT)
Dept: OBGYN | Age: 40
End: 2025-06-24

## 2025-06-26 LAB
HPV I/H RISK 4 DNA CVX QL NAA+PROBE: NOT DETECTED
HPV SAMPLE: NORMAL
HPV, INTERPRETATION: NORMAL
HPV16 DNA CVX QL NAA+PROBE: NOT DETECTED
HPV18 DNA CVX QL NAA+PROBE: NOT DETECTED
SPECIMEN DESCRIPTION: NORMAL